# Patient Record
Sex: MALE | Race: WHITE | NOT HISPANIC OR LATINO | Employment: OTHER | URBAN - METROPOLITAN AREA
[De-identification: names, ages, dates, MRNs, and addresses within clinical notes are randomized per-mention and may not be internally consistent; named-entity substitution may affect disease eponyms.]

---

## 2017-01-09 ENCOUNTER — HOSPITAL ENCOUNTER (OUTPATIENT)
Dept: RADIATION ONCOLOGY | Facility: HOSPITAL | Age: 67
Setting detail: RADIATION/ONCOLOGY SERIES
Discharge: HOME | End: 2017-01-09

## 2017-01-09 ENCOUNTER — OFFICE VISIT (OUTPATIENT)
Dept: RADIATION ONCOLOGY | Facility: HOSPITAL | Age: 67
End: 2017-01-09

## 2017-01-09 VITALS
DIASTOLIC BLOOD PRESSURE: 88 MMHG | SYSTOLIC BLOOD PRESSURE: 187 MMHG | RESPIRATION RATE: 16 BRPM | BODY MASS INDEX: 32.5 KG/M2 | TEMPERATURE: 98.3 F | OXYGEN SATURATION: 96 % | WEIGHT: 219.4 LBS | HEART RATE: 77 BPM | HEIGHT: 69 IN

## 2017-01-09 DIAGNOSIS — C61 PROSTATE CANCER (HCC): Primary | ICD-10-CM

## 2017-01-09 NOTE — MR AVS SNAPSHOT
"                        Tony No   1/9/2017 11:30 AM   Office Visit    Dept Phone:  [phone]   Encounter #:  [id number]    Provider:  Woodrow Decker MD   Department:  [facility]                Your Full Care Plan              Today's Medication Changes          These changes are accurate as of: 1/9/17  1:06 PM.  If you have any questions, ask your nurse or doctor.               Stop taking medication(s)listed here:     bicalutamide 50 MG chemo tablet   Commonly known as:  CASODEX   Stopped by:  Woodrow Decker MD           megestrol 40 MG tablet   Commonly known as:  MEGACE   Stopped by:  Woodrow Decker MD           tamsulosin 0.4 MG capsule 24 hr capsule   Commonly known as:  FLOMAX   Stopped by:  Woodrow Decker MD                      Your Updated Medication List          This list is accurate as of: 1/9/17  1:06 PM.  Always use your most recent med list.                amLODIPine 5 MG tablet   Commonly known as:  NORVASC       losartan 100 MG tablet   Commonly known as:  COZAAR       simvastatin 20 MG tablet   Commonly known as:  ZOCOR               You Were Diagnosed With        Codes Comments    Prostate cancer    -  Primary ICD-10-CM: C61  ICD-9-CM: 185       Instructions     None    Patient Instructions History      Upcoming Appointments     Visit Type Date Time Department    CK FU< 6 MONTHS 1/9/2017 11:30 AM [facility]      [email or web address] Signup     Our records indicate that you have declined [employer] signup. If you would like to sign up for [employer], please email [email or web address] or call [phone] to obtain an activation code.             Other Info from Your Visit           Allergies     No Known Allergies      Reason for Visit     Prostate Cancer CK P c/d 11/11/2016      Vital Signs     Blood Pressure Pulse Temperature Respirations Height Weight    187/88 77 98.3 °F (36.8 °C) (Oral) 16 69\" (175.3 cm) 219 lb 6.4 oz (99.5 kg) "    Oxygen Saturation Body Mass Index Smoking Status             96% 32.4 kg/m2 Former Smoker         Problems and Diagnoses Noted     Prostate cancer

## 2017-01-09 NOTE — PROGRESS NOTES
FOLLOW UP NOTE    PATIENT:                                                      Tony No  MEDICAL RECORD #:                        [id number]  :                                                          1950  COMPLETION DATE:   2016  DIAGNOSIS:     Prostate Cancer       BRIEF HISTORY:    Initial follow-up visit after [facility] SBRT for low risk prostate cancer, [place]'s 6 and maximum PSA 5.28.  He had discontinued hormonal therapy with Eligard and Casodex and has noted increased energy and libido.  Urinary function and bowel function is good.  PSA 2016 was 0.063.    MEDICATIONS: Medication reconciliation for the patient was reviewed and confirmed in the electronic medical record.    Review of Systems   Constitutional: Positive for appetite change.   Gastrointestinal:        More frequent stools    Psychiatric/Behavioral: The patient is nervous/anxious.      IPSS Questionnaire (AUA-7):  Over the past month…    1)  Incomplete Emptying  How often have you had a sensation of not emptying your bladder?  0 - Not at all   2)  Frequency  How often have you had to urinate less than every two hours? 0 - Not at all   3)  Intermittency  How often have you found you stopped and started again several times when you urinated?  0 - Not at all   4) Urgency  How often have you found it difficult to postpone urination?  0 - Not at all   5) Weak Stream  How often have you had a weak urinary stream?  0 - Not at all   6) Straining  How often have you had to push or strain to begin urination?  0 - Not at all   7) Nocturia  How many times did you typically get up at night to urinate?  1 - 1 time   Total Score:  1       Quality of life due to urinary symptoms:  If you were to spend the rest of your life with your urinary condition the way it is now, how would you feel about that? 1-Pleased   Urine Leakage (Incontinence) 0-No Leakage     Sexual Health Inventory  Current Status    1)  How do you rate your  confidence that you could achieve and keep an erection? 4-High   2) When you had erections with sexual stimulation, how often were your erections hard enough for penetration (entering your partner)? 5-Almost always or always   3)  During sexual intercourse, how often were you able to maintain your erection after you had penetrated (entered) into your partner? 5-Almost always or always   4) During sexual intercourse, how difficult was it to maintain your erection to completion of intercourse? 5-Not difficult   5) When you attempted sexual intercourse, how often was it satisfactory to you? 5-Almost always or always   Total Score: 24       Bowel Health Inventory  Current Status: 0-No problems, no rectal bleeding, no discharge, less then 5 bowel movements a day     KPS 80%    Physical Exam   Constitutional: He is oriented to person, place, and time. He appears well-developed and well-nourished.   HENT:   Head: Normocephalic and atraumatic.   Neck: Normal range of motion. Neck supple.   Cardiovascular: Normal rate, regular rhythm and normal heart sounds.    No murmur heard.  Pulmonary/Chest: Effort normal and breath sounds normal. He has no wheezes. He has no rales.   Abdominal: Soft. Bowel sounds are normal. He exhibits no distension. There is no hepatosplenomegaly. There is no tenderness.   Musculoskeletal: Normal range of motion. He exhibits no edema or tenderness.   Lymphadenopathy:     He has no cervical adenopathy.     He has no axillary adenopathy.        Right: No supraclavicular adenopathy present.        Left: No supraclavicular adenopathy present.   Neurological: He is alert and oriented to person, place, and time. He has normal strength. No sensory deficit.   Skin: Skin is warm and dry.   Psychiatric: He has a normal mood and affect. His behavior is normal. Judgment and thought content normal.   Nursing note and vitals reviewed.      VITAL SIGNS:   Vitals:    01/09/17 1204   BP: (!) 187/88   Pulse: 77   Resp:  "16   Temp: 98.3 °F (36.8 °C)   TempSrc: Oral   SpO2: 96%   Weight: 219 lb 6.4 oz (99.5 kg)   Height: 69\" (175.3 cm)   PainSc: 0-No pain       The following portions of the patient's history were reviewed and updated as appropriate: allergies, current medications, past family history, past medical history, past social history, past surgical history and problem list.         Prostate cancer [C61]    IMPRESSION:  He tolerated stereotactic body radiotherapy very well and currently has no complaints.    RECOMMENDATIONS:  Continue urologic surveillance and PSA testing with Dr. Castañeda.  He understands that he may experience an increased PSA bounce with rebound after cessation of androgen ablation before we see optimal results from SBRT.     Return in about 6 months (around 7/9/2017) for Office Visit.     Woodrow Decker MD    Errors in dictation may reflect use of voice recognition software and not all errors in transcription may have been detected prior to signing.  "

## 2017-02-07 ENCOUNTER — DOCUMENTATION (OUTPATIENT)
Dept: RADIATION ONCOLOGY | Facility: HOSPITAL | Age: 67
End: 2017-02-07

## 2017-02-07 NOTE — PROGRESS NOTES
Patient called with concerns. He states that he passed blood and a small piece of tissue when he urinted this morning. Talked to Dr. Decker and he says this can be normal after the CK and since it's only been twice now, there is no reason for concern. He agreed.

## 2017-07-07 ENCOUNTER — HOSPITAL ENCOUNTER (OUTPATIENT)
Dept: RADIATION ONCOLOGY | Facility: HOSPITAL | Age: 67
Setting detail: RADIATION/ONCOLOGY SERIES
Discharge: HOME | End: 2017-07-07

## 2017-07-07 ENCOUNTER — OFFICE VISIT (OUTPATIENT)
Dept: RADIATION ONCOLOGY | Facility: HOSPITAL | Age: 67
End: 2017-07-07

## 2017-07-07 VITALS
SYSTOLIC BLOOD PRESSURE: 173 MMHG | WEIGHT: 208.6 LBS | DIASTOLIC BLOOD PRESSURE: 82 MMHG | BODY MASS INDEX: 30.9 KG/M2 | HEIGHT: 69 IN | HEART RATE: 82 BPM | RESPIRATION RATE: 16 BRPM | TEMPERATURE: 98.6 F

## 2017-07-07 DIAGNOSIS — C61 PROSTATE CANCER (HCC): Primary | ICD-10-CM

## 2017-07-07 PROCEDURE — G0463 HOSPITAL OUTPT CLINIC VISIT: HCPCS

## 2017-07-07 RX ORDER — SILODOSIN 8 MG/1
CAPSULE ORAL
COMMUNITY
Start: 2017-06-20

## 2017-07-07 RX ORDER — FINASTERIDE 5 MG/1
TABLET, FILM COATED ORAL
COMMUNITY
Start: 2017-06-20

## 2017-07-07 NOTE — PROGRESS NOTES
FOLLOW UP NOTE    PATIENT:                                                      Tony No  MEDICAL RECORD #:                        [id number]  :                                                          1950  COMPLETION DATE:   2016  DIAGNOSIS:     Prostate cancer    Staging form: Prostate, AJCC V7      Clinical stage from 2015: Stage I (T1c, N0, M0, PSA: Less than 10, [place] <= 6)       BRIEF HISTORY:    Routine follow-up visit.  He has low risk, [place]'s 6, pretreatment PSA 5.28 ng/ml prostate cancer treated with stereotactic body radiotherapy after androgen ablation as initial treatment/downsizing.  He has no residual treatment-related bladder symptoms or bowel symptoms.  Initial exacerbation of fatigue has resolved however he continues to slowly regaining strength and energy, likely from normalization of testosterone.  He is also regrowing body hair.  PSAs have been undetectable with most recent value less than 0.064 ng/ml on 17.    MEDICATIONS: Medication reconciliation for the patient was reviewed and confirmed in the electronic medical record.    Review of Systems   Genitourinary: Positive for frequency and nocturia.    All other systems reviewed and are negative.      %    Physical Exam   Constitutional: He is oriented to person, place, and time. He appears well-developed and well-nourished.   HENT:   Head: Normocephalic and atraumatic.   Neck: Normal range of motion. Neck supple.   Cardiovascular: Normal rate, regular rhythm and normal heart sounds.    No murmur heard.  Pulmonary/Chest: Effort normal and breath sounds normal. He has no wheezes. He has no rales.   Abdominal: Soft. Bowel sounds are normal. He exhibits no distension. There is no hepatosplenomegaly. There is no tenderness.   Genitourinary: Enlarged: MARLEY not performed per patient request.   Musculoskeletal: Normal range of motion. He exhibits no edema or tenderness.   Lymphadenopathy:     He has no cervical  adenopathy.     He has no axillary adenopathy.        Right: No supraclavicular adenopathy present.        Left: No supraclavicular adenopathy present.   Neurological: He is alert and oriented to person, place, and time. He has normal strength. No sensory deficit.   Skin: Skin is warm and dry.   Psychiatric: He has a normal mood and affect. His behavior is normal. Judgment and thought content normal.   Nursing note and vitals reviewed.       IPSS Questionnaire (AUA-7):  Over the past month…    1)  Incomplete Emptying  How often have you had a sensation of not emptying your bladder?  1 - Less than 1 time in 5   2)  Frequency  How often have you had to urinate less than every two hours? 5 - Almost always   3)  Intermittency  How often have you found you stopped and started again several times when you urinated?  1 - Less than 1 time in 5   4) Urgency  How often have you found it difficult to postpone urination?  1 - Less than 1 time in 5   5) Weak Stream  How often have you had a weak urinary stream?  1 - Less than 1 time in 5   6) Straining  How often have you had to push or strain to begin urination?  1 - Less than 1 time in 5   7) Nocturia  How many times did you typically get up at night to urinate?  0 - None   Total Score:  10       Quality of life due to urinary symptoms:  If you were to spend the rest of your life with your urinary condition the way it is now, how would you feel about that? 3-Mixed   Urine Leakage (Incontinence) 0-No Leakage     Sexual Health Inventory  Current Status    1)  How do you rate your confidence that you could achieve and keep an erection? 1-Very Low   2) When you had erections with sexual stimulation, how often were your erections hard enough for penetration (entering your partner)? 2-A few times (much less than half the time)   3)  During sexual intercourse, how often were you able to maintain your erection after you had penetrated (entered) into your partner? 2-A few times (much  "less than half the time)   4) During sexual intercourse, how difficult was it to maintain your erection to completion of intercourse? 2-Very difficult   5) When you attempted sexual intercourse, how often was it satisfactory to you? 2-A few times (much less than half the time)   Total Score: 9       Bowel Health Inventory  Current Status: 0-No problems, no rectal bleeding, no discharge, less then 5 bowel movements a day       VITAL SIGNS:   Vitals:    07/07/17 1006   BP: 173/82   Pulse: 82   Resp: 16   Temp: 98.6 °F (37 °C)   Weight: 208 lb 9.6 oz (94.6 kg)   Height: 69\" (175.3 cm)   PainSc: 0-No pain       The following portions of the patient's history were reviewed and updated as appropriate: allergies, current medications, past family history, past medical history, past social history, past surgical history and problem list.         Tony was seen today for prostate cancer.    Diagnoses and all orders for this visit:    Prostate cancer       IMPRESSION:  No evidence of recurrent prostate cancer.      RECOMMENDATIONS:  Continue urologic surveillance with Dr. Castañeda.    Return in about 1 year (around 7/7/2018) for Office Visit.    Woodrow Decker MD    Errors in dictation may reflect use of voice recognition software and not all errors in transcription may have been detected prior to signing.  "

## 2017-07-10 ENCOUNTER — HOSPITAL ENCOUNTER (OUTPATIENT)
Dept: RADIATION ONCOLOGY | Facility: HOSPITAL | Age: 67
Setting detail: RADIATION/ONCOLOGY SERIES
Discharge: HOME | End: 2017-07-10